# Patient Record
Sex: FEMALE | Race: WHITE | NOT HISPANIC OR LATINO | Employment: FULL TIME | URBAN - METROPOLITAN AREA
[De-identification: names, ages, dates, MRNs, and addresses within clinical notes are randomized per-mention and may not be internally consistent; named-entity substitution may affect disease eponyms.]

---

## 2023-03-01 LAB
THYROTROPIN (MIU/L) IN SER/PLAS BY DETECTION LIMIT <= 0.05 MIU/L: 3.86 MIU/L (ref 0.44–3.98)
THYROXINE (T4) FREE (NG/DL) IN SER/PLAS: 0.98 NG/DL (ref 0.61–1.12)

## 2023-08-31 PROBLEM — M47.816 OSTEOARTHRITIS OF LUMBAR SPINE: Status: ACTIVE | Noted: 2023-08-31

## 2023-08-31 PROBLEM — R92.8 ABNORMAL MAMMOGRAM OF LEFT BREAST: Status: ACTIVE | Noted: 2023-08-31

## 2023-08-31 PROBLEM — E16.2 HYPOGLYCEMIA: Status: ACTIVE | Noted: 2023-08-31

## 2023-08-31 PROBLEM — E55.9 VITAMIN D DEFICIENCY: Status: ACTIVE | Noted: 2023-08-31

## 2023-08-31 PROBLEM — D22.9 ATYPICAL MOLE: Status: ACTIVE | Noted: 2023-08-31

## 2023-08-31 PROBLEM — E03.8 SUBCLINICAL HYPOTHYROIDISM: Status: ACTIVE | Noted: 2023-08-31

## 2023-08-31 PROBLEM — R79.89 ELEVATED TSH: Status: ACTIVE | Noted: 2023-08-31

## 2023-08-31 RX ORDER — PERMETHRIN 50 MG/G
CREAM TOPICAL
COMMUNITY
Start: 2022-09-02 | End: 2024-03-19 | Stop reason: WASHOUT

## 2023-08-31 RX ORDER — IBUPROFEN 200 MG
200 TABLET ORAL EVERY 6 HOURS
COMMUNITY
End: 2024-03-19 | Stop reason: WASHOUT

## 2023-08-31 RX ORDER — LEVOTHYROXINE SODIUM 25 UG/1
1 TABLET ORAL DAILY
COMMUNITY
Start: 2022-05-09 | End: 2023-09-25

## 2023-09-11 LAB — THYROTROPIN (MIU/L) IN SER/PLAS BY DETECTION LIMIT <= 0.05 MIU/L: 2.25 MIU/L (ref 0.44–3.98)

## 2023-09-25 ENCOUNTER — OFFICE VISIT (OUTPATIENT)
Dept: PRIMARY CARE | Facility: CLINIC | Age: 43
End: 2023-09-25
Payer: COMMERCIAL

## 2023-09-25 VITALS
DIASTOLIC BLOOD PRESSURE: 77 MMHG | TEMPERATURE: 97 F | BODY MASS INDEX: 24.96 KG/M2 | SYSTOLIC BLOOD PRESSURE: 122 MMHG | RESPIRATION RATE: 18 BRPM | OXYGEN SATURATION: 98 % | HEIGHT: 67 IN | WEIGHT: 159 LBS | HEART RATE: 55 BPM

## 2023-09-25 DIAGNOSIS — E55.9 VITAMIN D DEFICIENCY: ICD-10-CM

## 2023-09-25 DIAGNOSIS — F90.0 ATTENTION DEFICIT HYPERACTIVITY DISORDER (ADHD), PREDOMINANTLY INATTENTIVE TYPE: ICD-10-CM

## 2023-09-25 DIAGNOSIS — I73.00 RAYNAUD'S DISEASE WITHOUT GANGRENE: ICD-10-CM

## 2023-09-25 DIAGNOSIS — E03.8 SUBCLINICAL HYPOTHYROIDISM: ICD-10-CM

## 2023-09-25 DIAGNOSIS — Z12.31 SCREENING MAMMOGRAM FOR BREAST CANCER: ICD-10-CM

## 2023-09-25 DIAGNOSIS — Z01.00 EYE EXAM NORMAL: Primary | ICD-10-CM

## 2023-09-25 DIAGNOSIS — Z13.220 SCREENING, LIPID: ICD-10-CM

## 2023-09-25 PROBLEM — R10.2 PAIN, PELVIC, FEMALE: Status: ACTIVE | Noted: 2023-09-25

## 2023-09-25 PROBLEM — J45.909 UNCOMPLICATED ASTHMA (HHS-HCC): Status: ACTIVE | Noted: 2023-09-25

## 2023-09-25 PROBLEM — M76.72 PERONEAL TENDINITIS OF LEFT LOWER EXTREMITY: Status: ACTIVE | Noted: 2023-09-25

## 2023-09-25 PROBLEM — N39.41 URGE INCONTINENCE: Status: ACTIVE | Noted: 2023-09-25

## 2023-09-25 PROBLEM — R53.83 FATIGUE: Status: ACTIVE | Noted: 2023-09-25

## 2023-09-25 PROBLEM — U07.1 COVID-19 VIRUS INFECTION: Status: ACTIVE | Noted: 2023-09-25

## 2023-09-25 PROBLEM — R39.15 URINARY URGENCY: Status: ACTIVE | Noted: 2023-09-25

## 2023-09-25 PROBLEM — M79.672 LEFT FOOT PAIN: Status: ACTIVE | Noted: 2023-09-25

## 2023-09-25 PROBLEM — R53.83 TIREDNESS: Status: ACTIVE | Noted: 2023-09-25

## 2023-09-25 PROBLEM — H04.129 DRY EYE: Status: ACTIVE | Noted: 2023-09-25

## 2023-09-25 PROBLEM — M54.50 LOWER BACK PAIN: Status: ACTIVE | Noted: 2023-09-25

## 2023-09-25 LAB
POC AMPHETAMINE: NEGATIVE NG/ML
POC BARBITURATES: NEGATIVE NG/ML
POC BENZODIAZEPINES: NEGATIVE NG/ML
POC BUPRENORPHINE URINE: NEGATIVE NG/ML
POC COCAINE: NEGATIVE NG/ML
POC MDMA (NG/ML) IN URINE: NEGATIVE NG/ML
POC METHADONE MANUALLY ENTERED: NEGATIVE NG/ML
POC METHAMPHETAMINE: NEGATIVE NG/ML
POC MORPHINE URINE: NEGATIVE NG/ML
POC OPIATES: NORMAL NG/ML
POC OXYCODONE: NEGATIVE NG/ML
POC PHENCYCLIDINE (PCP): NEGATIVE NG/ML
POC THC: NEGATIVE NG/ML
POC TICYCLIC ANTIDEPRESSANTS (TCA): NORMAL NG/ML

## 2023-09-25 PROCEDURE — 99396 PREV VISIT EST AGE 40-64: CPT | Performed by: NURSE PRACTITIONER

## 2023-09-25 PROCEDURE — 80305 DRUG TEST PRSMV DIR OPT OBS: CPT | Performed by: NURSE PRACTITIONER

## 2023-09-25 PROCEDURE — 99213 OFFICE O/P EST LOW 20 MIN: CPT | Performed by: NURSE PRACTITIONER

## 2023-09-25 PROCEDURE — 1036F TOBACCO NON-USER: CPT | Performed by: NURSE PRACTITIONER

## 2023-09-25 RX ORDER — DEXTROAMPHETAMINE SACCHARATE, AMPHETAMINE ASPARTATE MONOHYDRATE, DEXTROAMPHETAMINE SULFATE AND AMPHETAMINE SULFATE 3.75; 3.75; 3.75; 3.75 MG/1; MG/1; MG/1; MG/1
15 CAPSULE, EXTENDED RELEASE ORAL DAILY
Qty: 30 CAPSULE | Refills: 0 | Status: SHIPPED | OUTPATIENT
Start: 2023-09-25 | End: 2023-11-06 | Stop reason: SDUPTHER

## 2023-09-25 RX ORDER — DEXTROAMPHETAMINE SACCHARATE, AMPHETAMINE ASPARTATE MONOHYDRATE, DEXTROAMPHETAMINE SULFATE AND AMPHETAMINE SULFATE 3.75; 3.75; 3.75; 3.75 MG/1; MG/1; MG/1; MG/1
15 CAPSULE, EXTENDED RELEASE ORAL EVERY MORNING
Refills: 0 | Status: CANCELLED | OUTPATIENT
Start: 2023-09-25 | End: 2023-10-02

## 2023-09-25 ASSESSMENT — ENCOUNTER SYMPTOMS
FEVER: 0
ABDOMINAL PAIN: 0
PALPITATIONS: 0
NAUSEA: 0
WEAKNESS: 0
COUGH: 0
SPEECH DIFFICULTY: 0
SLEEP DISTURBANCE: 0
WHEEZING: 0
SORE THROAT: 0
NUMBNESS: 1
SHORTNESS OF BREATH: 0
MYALGIAS: 0
ACTIVITY CHANGE: 1
WOUND: 0
CHILLS: 0
VOMITING: 0
ARTHRALGIAS: 0
HEADACHES: 0
CONFUSION: 0
DIZZINESS: 0
NERVOUS/ANXIOUS: 0
APNEA: 0

## 2023-09-25 ASSESSMENT — PATIENT HEALTH QUESTIONNAIRE - PHQ9
1. LITTLE INTEREST OR PLEASURE IN DOING THINGS: NOT AT ALL
SUM OF ALL RESPONSES TO PHQ9 QUESTIONS 1 AND 2: 0
2. FEELING DOWN, DEPRESSED OR HOPELESS: NOT AT ALL

## 2023-09-25 NOTE — PATIENT INSTRUCTIONS
Raynaud’s can be managed with both lifestyle modifications and medications. Patients can use mittens/gloves, thick socks, and insertable warmers to help keep their hands and feet warm. Stress reduction and smoking cessation are also recommended to help decrease Raynaud’s attacks. Medications such as calcium channel blockers (amlodipine, nifedipine, felodipine, and others) and angiotensin-receptor blockers increase blood flow to the fingers and toes. For patients with more severe symptoms or who have developed complications such as ulcers, other medications can be used including sildenafil or prostacyclins.    Living with Raynaud's Phenomenon  Wear warm, protective clothing like socks, boots, mittens or gloves in the fall and winter. In the summer months, avoid keeping the temperature of the AC too low. Wear oven mitts or gloves when taking food out of the freezer or refrigerator. Exercise regularly as this can boost healthy circulation and try to decrease stressful triggers. If symptoms occur, rewarm your body, place hands under the armpits, wiggle fingers and toes, run fingers or toes under warm (not hot) water, or massage the hands and feet.

## 2023-09-25 NOTE — PROGRESS NOTES
Subjective   Patient ID: Solange Akins is a 43 y.o. female who presents for Annual Exam and ADD.    Well Adult Physical   Patient here for a comprehensive physical exam.The patient reports problems - raynauds's, family hx of pagets, ADD     Do you take any herbs or supplements that were not prescribed by a doctor? no   Are you taking calcium supplements? no   Are you taking aspirin daily? no    Patient complains of Raynaud's phenomenon.  Present in bilateral lower extremity and bilateral upper extremities.  Reports exercise typically improves.  Does have a cool, pale and bluish fingertips on today.  Patient is not open to calcium channel blocker at this time.    Family hx of pagels -will recheck   No other risk factors    .Lab Results       Component                Value               Date                       ALKPHOS                  50                  03/31/2022                 Reports that her oldest son was recently diagnosed with ADHD and is currently on Concerta.  Patient reports during the office visit she was told to do an online evaluation for ADD/ADHD and reports of doing online testing which was significant for inattentive disorder.  Patient is seeking treatment today.  I did discuss that we should consult with her seeing a specialist but I am open to starting her on a stimulant after she does a self assessment today in the office.    Hypothyroidism: patient has stopped levothyroixine. Managed by endocrinology   Stable tsh, .Lab Results       Component                Value               Date                       TSH                      2.25                09/11/2023               Needs well eye exam, no issues however          Review of Systems   Constitutional:  Positive for activity change. Negative for chills and fever.   HENT:  Negative for congestion, postnasal drip, sneezing and sore throat.    Respiratory:  Negative for apnea, cough, shortness of breath and wheezing.   "  Cardiovascular:  Negative for chest pain and palpitations.   Gastrointestinal:  Negative for abdominal pain, nausea and vomiting.   Musculoskeletal:  Negative for arthralgias and myalgias.   Skin:  Negative for rash and wound.   Neurological:  Positive for numbness. Negative for dizziness, syncope, speech difficulty, weakness and headaches.   Psychiatric/Behavioral:  Negative for confusion and sleep disturbance. The patient is not nervous/anxious.        Objective   /77   Pulse 55   Temp 36.1 °C (97 °F) (Temporal)   Resp 18   Ht 1.702 m (5' 7\")   Wt 72.1 kg (159 lb)   SpO2 98%   BMI 24.90 kg/m²     Physical Exam  Vitals reviewed.   Constitutional:       Appearance: Normal appearance.   Cardiovascular:      Rate and Rhythm: Normal rate and regular rhythm.      Pulses: Normal pulses.      Heart sounds: Normal heart sounds.   Pulmonary:      Effort: Pulmonary effort is normal.      Breath sounds: Normal breath sounds.   Skin:     Coloration: Skin is cyanotic.          Neurological:      Mental Status: She is alert and oriented to person, place, and time.   Psychiatric:         Mood and Affect: Mood normal.         Behavior: Behavior normal.         Assessment/Plan   Problem List Items Addressed This Visit       Subclinical hypothyroidism     Well controlled   Follow up with endocrinology for management           Relevant Orders    CBC    Comprehensive Metabolic Panel    TSH with reflex to Free T4 if abnormal    Vitamin D deficiency    Relevant Orders    Vitamin D 1,25 Dihydroxy (for eval of hypercalcemia)    Attention deficit hyperactivity disorder (ADHD), predominantly inattentive type     Start [id number] XR daily   Referral to psych for management   Follow up 4-6 weeks            Relevant Medications    amphetamine-dextroamphetamine XR (Adderall XR) 15 mg 24 hr capsule    Other Relevant Orders    Referral to Psychiatry    POCT waived urine drug screen manually resulted (Completed)    Raynaud's " disease without gangrene     Raynaud’s can be managed with both lifestyle modifications and medications. Patients can use mittens/gloves, thick socks, and insertable warmers to help keep their hands and feet warm. Stress reduction and smoking cessation are also recommended to help decrease Raynaud’s attacks. Medications such as calcium channel blockers (amlodipine, nifedipine, felodipine, and others) and angiotensin-receptor blockers increase blood flow to the fingers and toes. For patients with more severe symptoms or who have developed complications such as ulcers, other medications can be used including sildenafil or prostacyclins.    Living with Raynaud's Phenomenon  Wear warm, protective clothing like socks, boots, mittens or gloves in the fall and winter. In the summer months, avoid keeping the temperature of the AC too low. Wear oven mitts or gloves when taking food out of the freezer or refrigerator. Exercise regularly as this can boost healthy circulation and try to decrease stressful triggers. If symptoms occur, rewarm your body, place hands under the armpits, wiggle fingers and toes, run fingers or toes under warm (not hot) water, or massage the hands and feet.         Eye exam normal - Primary    Relevant Orders    Referral to Ophthalmology     Other Visit Diagnoses       Screening mammogram for breast cancer        Relevant Orders    BI mammo bilateral screening tomosynthesis    Screening, lipid        Relevant Orders    CBC    Lipid Panel

## 2023-11-06 ENCOUNTER — TELEPHONE (OUTPATIENT)
Dept: PRIMARY CARE | Facility: CLINIC | Age: 43
End: 2023-11-06
Payer: COMMERCIAL

## 2023-11-06 DIAGNOSIS — F90.0 ATTENTION DEFICIT HYPERACTIVITY DISORDER (ADHD), PREDOMINANTLY INATTENTIVE TYPE: ICD-10-CM

## 2023-11-06 RX ORDER — DEXTROAMPHETAMINE SACCHARATE, AMPHETAMINE ASPARTATE MONOHYDRATE, DEXTROAMPHETAMINE SULFATE AND AMPHETAMINE SULFATE 3.75; 3.75; 3.75; 3.75 MG/1; MG/1; MG/1; MG/1
15 CAPSULE, EXTENDED RELEASE ORAL DAILY
Qty: 30 CAPSULE | Refills: 0 | Status: SHIPPED | OUTPATIENT
Start: 2023-11-06

## 2023-11-06 NOTE — TELEPHONE ENCOUNTER
Rx Refill Request Telephone Encounter    Name:  Solange Akins  :  [id number]  Medication Name:        amphetamine-dextroamphetamine XR (Adderall XR) 15 mg 24 hr capsule [[id number]]  1 cap taken once daily                    Specific Pharmacy location:  [facility]  Date of last appointment:  2023  Date of next appointment:  2024  Best number to reach patient:  [phone]

## 2023-11-13 ENCOUNTER — ANCILLARY PROCEDURE (OUTPATIENT)
Dept: RADIOLOGY | Facility: CLINIC | Age: 43
End: 2023-11-13
Payer: COMMERCIAL

## 2023-11-13 DIAGNOSIS — Z12.31 SCREENING MAMMOGRAM FOR BREAST CANCER: ICD-10-CM

## 2023-11-13 PROCEDURE — 77063 BREAST TOMOSYNTHESIS BI: CPT | Mod: BILATERAL PROCEDURE | Performed by: RADIOLOGY

## 2023-11-13 PROCEDURE — 77067 SCR MAMMO BI INCL CAD: CPT

## 2023-11-13 PROCEDURE — 77063 BREAST TOMOSYNTHESIS BI: CPT

## 2023-11-13 PROCEDURE — 77067 SCR MAMMO BI INCL CAD: CPT | Mod: BILATERAL PROCEDURE | Performed by: RADIOLOGY

## 2023-12-21 ENCOUNTER — OFFICE VISIT (OUTPATIENT)
Dept: OPHTHALMOLOGY | Facility: CLINIC | Age: 43
End: 2023-12-21
Payer: COMMERCIAL

## 2023-12-21 DIAGNOSIS — H52.03 HYPEROPIA WITH PRESBYOPIA OF BOTH EYES: ICD-10-CM

## 2023-12-21 DIAGNOSIS — Z01.00 EYE EXAM NORMAL: ICD-10-CM

## 2023-12-21 DIAGNOSIS — H52.4 HYPEROPIA WITH PRESBYOPIA OF BOTH EYES: ICD-10-CM

## 2023-12-21 DIAGNOSIS — H18.831 RCE (RECURRENT CORNEAL EROSION), RIGHT: Primary | ICD-10-CM

## 2023-12-21 PROCEDURE — 92004 COMPRE OPH EXAM NEW PT 1/>: CPT | Performed by: OPTOMETRIST

## 2023-12-21 ASSESSMENT — EXTERNAL EXAM - LEFT EYE: OS_EXAM: NORMAL

## 2023-12-21 ASSESSMENT — REFRACTION
OD_SPHERE: +1.50
OD_CYLINDER: -0.50
OD_ADD: +1.00
OS_SPHERE: +1.00
OD_AXIS: 090
OS_ADD: +1.00
OS_CYLINDER: SPHERE

## 2023-12-21 ASSESSMENT — REFRACTION_MANIFEST
OS_CYLINDER: SPHERE
OS_CYLINDER: SPHERE
OD_CYLINDER: -0.50
OD_CYLINDER: -0.50
METHOD_AUTOREFRACTION: 1
OD_SPHERE: +1.50
OS_SPHERE: +1.50
OD_AXIS: 090
OD_AXIS: 085
OS_SPHERE: +0.25
OD_SPHERE: +0.75

## 2023-12-21 ASSESSMENT — REFRACTION_WEARINGRX
OS_SPHERE: +1.25
OS_CYLINDER: SPHERE
OD_SPHERE: +1.00
OD_CYLINDER: SPHERE

## 2023-12-21 ASSESSMENT — CONF VISUAL FIELD
OD_SUPERIOR_NASAL_RESTRICTION: 0
OD_NORMAL: 1
OS_NORMAL: 1
OS_INFERIOR_TEMPORAL_RESTRICTION: 0
OD_INFERIOR_NASAL_RESTRICTION: 0
OD_SUPERIOR_TEMPORAL_RESTRICTION: 0
OS_SUPERIOR_NASAL_RESTRICTION: 0
OD_INFERIOR_TEMPORAL_RESTRICTION: 0
OS_INFERIOR_NASAL_RESTRICTION: 0
OS_SUPERIOR_TEMPORAL_RESTRICTION: 0

## 2023-12-21 ASSESSMENT — ENCOUNTER SYMPTOMS
PSYCHIATRIC NEGATIVE: 0
HEMATOLOGIC/LYMPHATIC NEGATIVE: 0
MUSCULOSKELETAL NEGATIVE: 0
ENDOCRINE NEGATIVE: 0
GASTROINTESTINAL NEGATIVE: 0
CONSTITUTIONAL NEGATIVE: 0
NEUROLOGICAL NEGATIVE: 0
CARDIOVASCULAR NEGATIVE: 0
ALLERGIC/IMMUNOLOGIC NEGATIVE: 0
RESPIRATORY NEGATIVE: 0
EYES NEGATIVE: 1

## 2023-12-21 ASSESSMENT — EXTERNAL EXAM - RIGHT EYE: OD_EXAM: NORMAL

## 2023-12-21 ASSESSMENT — VISUAL ACUITY
OD_CC: 20/20
METHOD: SNELLEN - LINEAR
OS_CC: 20/20
CORRECTION_TYPE: GLASSES

## 2023-12-21 ASSESSMENT — TONOMETRY
OS_IOP_MMHG: 16
IOP_METHOD: TONOPEN
OD_IOP_MMHG: 12

## 2023-12-21 ASSESSMENT — SLIT LAMP EXAM - LIDS: COMMENTS: NORMAL

## 2023-12-21 NOTE — PROGRESS NOTES
RCE and can use iVizia QAM and instill in closed eye between eyelids for reduced risk of recurrent RCE.    Mild latent hyperopia and now onset of presbyopia. A spectacle prescription was dispensed to be used as needed.     The patient was asked to return to our clinic in one year or sooner if ocular or vision changes occur.

## 2024-03-18 ENCOUNTER — LAB (OUTPATIENT)
Dept: LAB | Facility: LAB | Age: 44
End: 2024-03-18
Payer: COMMERCIAL

## 2024-03-18 DIAGNOSIS — E55.9 VITAMIN D DEFICIENCY: ICD-10-CM

## 2024-03-18 DIAGNOSIS — E03.8 SUBCLINICAL HYPOTHYROIDISM: ICD-10-CM

## 2024-03-18 DIAGNOSIS — Z13.220 SCREENING, LIPID: ICD-10-CM

## 2024-03-18 LAB
ALBUMIN SERPL BCP-MCNC: 4.3 G/DL (ref 3.4–5)
ALP SERPL-CCNC: 51 U/L (ref 33–110)
ALT SERPL W P-5'-P-CCNC: 12 U/L (ref 7–45)
ANION GAP SERPL CALC-SCNC: 10 MMOL/L (ref 10–20)
AST SERPL W P-5'-P-CCNC: 18 U/L (ref 9–39)
BILIRUB SERPL-MCNC: 0.3 MG/DL (ref 0–1.2)
BUN SERPL-MCNC: 11 MG/DL (ref 6–23)
CALCIUM SERPL-MCNC: 8.7 MG/DL (ref 8.6–10.3)
CHLORIDE SERPL-SCNC: 108 MMOL/L (ref 98–107)
CHOLEST SERPL-MCNC: 149 MG/DL (ref 0–199)
CHOLESTEROL/HDL RATIO: 1.9
CO2 SERPL-SCNC: 27 MMOL/L (ref 21–32)
CREAT SERPL-MCNC: 0.79 MG/DL (ref 0.5–1.05)
EGFRCR SERPLBLD CKD-EPI 2021: >90 ML/MIN/1.73M*2
ERYTHROCYTE [DISTWIDTH] IN BLOOD BY AUTOMATED COUNT: 13.5 % (ref 11.5–14.5)
GLUCOSE SERPL-MCNC: 88 MG/DL (ref 74–99)
HCT VFR BLD AUTO: 39.8 % (ref 36–46)
HDLC SERPL-MCNC: 77 MG/DL
HGB BLD-MCNC: 13 G/DL (ref 12–16)
LDLC SERPL CALC-MCNC: 58 MG/DL
MCH RBC QN AUTO: 30 PG (ref 26–34)
MCHC RBC AUTO-ENTMCNC: 32.7 G/DL (ref 32–36)
MCV RBC AUTO: 92 FL (ref 80–100)
NON HDL CHOLESTEROL: 72 MG/DL (ref 0–149)
NRBC BLD-RTO: 0 /100 WBCS (ref 0–0)
PLATELET # BLD AUTO: 229 X10*3/UL (ref 150–450)
POTASSIUM SERPL-SCNC: 4.1 MMOL/L (ref 3.5–5.3)
PROT SERPL-MCNC: 6.4 G/DL (ref 6.4–8.2)
RBC # BLD AUTO: 4.33 X10*6/UL (ref 4–5.2)
SODIUM SERPL-SCNC: 141 MMOL/L (ref 136–145)
TRIGL SERPL-MCNC: 68 MG/DL (ref 0–149)
TSH SERPL-ACNC: 2.31 MIU/L (ref 0.44–3.98)
VLDL: 14 MG/DL (ref 0–40)
WBC # BLD AUTO: 9.7 X10*3/UL (ref 4.4–11.3)

## 2024-03-18 PROCEDURE — 82652 VIT D 1 25-DIHYDROXY: CPT

## 2024-03-18 PROCEDURE — 84443 ASSAY THYROID STIM HORMONE: CPT

## 2024-03-18 PROCEDURE — 80061 LIPID PANEL: CPT

## 2024-03-18 PROCEDURE — 80053 COMPREHEN METABOLIC PANEL: CPT

## 2024-03-18 PROCEDURE — 36415 COLL VENOUS BLD VENIPUNCTURE: CPT

## 2024-03-18 PROCEDURE — 85027 COMPLETE CBC AUTOMATED: CPT

## 2024-03-19 ENCOUNTER — OFFICE VISIT (OUTPATIENT)
Dept: PRIMARY CARE | Facility: CLINIC | Age: 44
End: 2024-03-19
Payer: COMMERCIAL

## 2024-03-19 VITALS
HEART RATE: 65 BPM | TEMPERATURE: 97 F | RESPIRATION RATE: 16 BRPM | DIASTOLIC BLOOD PRESSURE: 78 MMHG | OXYGEN SATURATION: 98 % | HEIGHT: 67 IN | SYSTOLIC BLOOD PRESSURE: 125 MMHG | BODY MASS INDEX: 24.64 KG/M2 | WEIGHT: 157 LBS

## 2024-03-19 DIAGNOSIS — Z13.220 SCREENING, LIPID: ICD-10-CM

## 2024-03-19 DIAGNOSIS — E53.8 VITAMIN B12 DEFICIENCY: ICD-10-CM

## 2024-03-19 DIAGNOSIS — Z13.0 SCREENING FOR DEFICIENCY ANEMIA: ICD-10-CM

## 2024-03-19 DIAGNOSIS — E53.1 VITAMIN B6 DEFICIENCY: ICD-10-CM

## 2024-03-19 DIAGNOSIS — E55.9 VITAMIN D DEFICIENCY: Primary | ICD-10-CM

## 2024-03-19 DIAGNOSIS — R20.2 PARESTHESIA OF UPPER AND LOWER EXTREMITIES OF BOTH SIDES: ICD-10-CM

## 2024-03-19 DIAGNOSIS — R79.89 ELEVATED TSH: ICD-10-CM

## 2024-03-19 DIAGNOSIS — F90.0 ATTENTION DEFICIT HYPERACTIVITY DISORDER (ADHD), PREDOMINANTLY INATTENTIVE TYPE: ICD-10-CM

## 2024-03-19 PROCEDURE — 99213 OFFICE O/P EST LOW 20 MIN: CPT | Performed by: NURSE PRACTITIONER

## 2024-03-19 PROCEDURE — 1036F TOBACCO NON-USER: CPT | Performed by: NURSE PRACTITIONER

## 2024-03-19 RX ORDER — DEXTROAMPHETAMINE SACCHARATE, AMPHETAMINE ASPARTATE MONOHYDRATE, DEXTROAMPHETAMINE SULFATE AND AMPHETAMINE SULFATE 5; 5; 5; 5 MG/1; MG/1; MG/1; MG/1
25 CAPSULE, EXTENDED RELEASE ORAL
COMMUNITY
Start: 2023-12-15

## 2024-03-19 RX ORDER — ACETAMINOPHEN 500 MG
2000 TABLET ORAL
COMMUNITY
Start: 2019-10-09

## 2024-03-19 ASSESSMENT — ENCOUNTER SYMPTOMS
CONSTITUTIONAL NEGATIVE: 1
SHORTNESS OF BREATH: 0
PALPITATIONS: 0
CHILLS: 0
DIZZINESS: 0
ARTHRALGIAS: 0
SPEECH DIFFICULTY: 0
MYALGIAS: 0
APNEA: 0
NAUSEA: 0
SLEEP DISTURBANCE: 0
NERVOUS/ANXIOUS: 0
SORE THROAT: 0
HEADACHES: 0
CONFUSION: 0
FEVER: 0
WEAKNESS: 0
ABDOMINAL PAIN: 0
VOMITING: 0
ACTIVITY CHANGE: 0
NUMBNESS: 1
COUGH: 0

## 2024-03-19 NOTE — ASSESSMENT & PLAN NOTE
Emg study to rule out radiculopathy fiber neuropathy   Follow up prn   Can consider treating raynauds phenomenon

## 2024-03-19 NOTE — PROGRESS NOTES
Subjective   Patient ID: Solange Akins is a 43 y.o. female who presents for ADHD, Lab Review, and Numbness and Tingling of Pancho Upper and Lower Extremities.    Follow up lab review   Psych now managing adderall. Dose increased recently to 25mg. Doing well and benefiting     Raynauds: would like to consider meds eventually. Numbness / tingling and discolored hands and feet. Discussed conservative treatment plans at previous visit   Labs Reviewed   -CBC:   Collection Time: 03/18/24  8:10 AM       Result                      Value             Ref Range           WBC                         9.7               4.4 - 11.3 x*       nRBC                        0.0               0.0 - 0.0 /1*       RBC                         4.33              4.00 - 5.20 *       Hemoglobin                  13.0              12.0 - 16.0 *       Hematocrit                  39.8              36.0 - 46.0 %       MCV                         92                80 - 100 fL         MCH                         30.0              26.0 - 34.0 *       MCHC                        32.7              32.0 - 36.0 *       RDW                         13.5              11.5 - 14.5 %       Platelets                   229               150 - 450 x1*  -Comprehensive Metabolic Panel:   Collection Time: 03/18/24  8:10 AM       Result                      Value             Ref Range           Glucose                     88                74 - 99 mg/dL       Sodium                      141               136 - 145 mm*       Potassium                   4.1               3.5 - 5.3 mm*       Chloride                    108 (H)           98 - 107 mmo*       Bicarbonate                 27                21 - 32 mmol*       Anion Gap                   10                10 - 20 mmol*       Urea Nitrogen               11                6 - 23 mg/dL        Creatinine                  0.79              0.50 - 1.05 *       eGFR                        >90               >60  mL/min/1*       Calcium                     8.7               8.6 - 10.3 m*       Albumin                     4.3               3.4 - 5.0 g/*       Alkaline Phosphatase        51                33 - 110 U/L        Total Protein               6.4               6.4 - 8.2 g/*       AST                         18                9 - 39 U/L          Bilirubin, Total            0.3               0.0 - 1.2 mg*       ALT                         12                7 - 45 U/L     -Lipid Panel:   Collection Time: 03/18/24  8:10 AM       Result                      Value             Ref Range           Cholesterol                 149               0 - 199 mg/dL       HDL-Cholesterol             77.0              mg/dL               Cholesterol/HDL Ratio       1.9                                   LDL Calculated              58                <=99 mg/dL          VLDL                        14                0 - 40 mg/dL        Triglycerides               68                0 - 149 mg/dL       Non HDL Cholesterol         72                0 - 149 mg/dL  -TSH with reflex to Free T4 if abnormal:   Collection Time: 03/18/24  8:10 AM       Result                      Value             Ref Range           Thyroid Stimulating Ho*     2.31              0.44 - 3.98 *          Review of Systems   Constitutional: Negative.  Negative for activity change, chills and fever.   HENT:  Negative for congestion, postnasal drip, sneezing and sore throat.    Respiratory:  Negative for apnea, cough and shortness of breath.    Cardiovascular:  Negative for chest pain and palpitations.   Gastrointestinal:  Negative for abdominal pain, nausea and vomiting.   Musculoskeletal:  Negative for arthralgias and myalgias.   Neurological:  Positive for numbness. Negative for dizziness, syncope, speech difficulty, weakness and headaches.   Psychiatric/Behavioral:  Negative for confusion and sleep disturbance. The patient is not nervous/anxious.        Objective   BP  "125/78 (BP Location: Right arm, Patient Position: Sitting, BP Cuff Size: Adult long)   Pulse 65   Temp 36.1 °C (97 °F) (Temporal)   Resp 16   Ht 1.702 m (5' 7\")   Wt 71.2 kg (157 lb)   SpO2 98%   BMI 24.59 kg/m²     Physical Exam  Vitals reviewed.   Constitutional:       Appearance: Normal appearance.   Cardiovascular:      Rate and Rhythm: Normal rate and regular rhythm.      Pulses: Normal pulses.      Heart sounds: Normal heart sounds.   Pulmonary:      Effort: Pulmonary effort is normal.      Breath sounds: Normal breath sounds.   Abdominal:      General: Bowel sounds are normal.   Neurological:      Mental Status: She is alert and oriented to person, place, and time.   Psychiatric:         Mood and Affect: Mood normal.         Behavior: Behavior normal.         Assessment/Plan   Problem List Items Addressed This Visit             ICD-10-CM    Elevated TSH R79.89     Stable, continue to monitor            Relevant Orders    TSH with reflex to Free T4 if abnormal    Vitamin D deficiency - Primary E55.9     Continue supplement   Vitamin d normal            Relevant Orders    Vitamin D 1,25 Dihydroxy (for eval of hypercalcemia)    Attention deficit hyperactivity disorder (ADHD), predominantly inattentive type F90.0     Managed by psychiatry   On adderall 25mg daily   Call for worsening            Paresthesia of upper and lower extremities of both sides R20.2     Emg study to rule out radiculopathy fiber neuropathy   Follow up prn   Can consider treating raynauds phenomenon          Relevant Orders    EMG & nerve conduction    Comprehensive Metabolic Panel     Other Visit Diagnoses         Codes    Screening, lipid     Z13.220    Relevant Orders    Lipid Panel    Screening for deficiency anemia     Z13.0    Relevant Orders    CBC and Auto Differential    Vitamin B12 deficiency     E53.8    Relevant Orders    Vitamin B12    Vitamin B6 deficiency     E53.1    Relevant Orders    Vitamin B6                  "

## 2024-03-20 LAB — 1,25(OH)2D3 SERPL-MCNC: 38.5 PG/ML (ref 19.9–79.3)

## 2024-03-25 ENCOUNTER — APPOINTMENT (OUTPATIENT)
Dept: PRIMARY CARE | Facility: CLINIC | Age: 44
End: 2024-03-25
Payer: COMMERCIAL

## 2024-11-15 ENCOUNTER — OFFICE VISIT (OUTPATIENT)
Dept: URGENT CARE | Age: 44
End: 2024-11-15
Payer: COMMERCIAL

## 2024-11-15 ENCOUNTER — ANCILLARY PROCEDURE (OUTPATIENT)
Dept: URGENT CARE | Age: 44
End: 2024-11-15
Payer: COMMERCIAL

## 2024-11-15 VITALS — OXYGEN SATURATION: 98 % | DIASTOLIC BLOOD PRESSURE: 85 MMHG | HEART RATE: 74 BPM | SYSTOLIC BLOOD PRESSURE: 118 MMHG

## 2024-11-15 DIAGNOSIS — R07.81 RIB PAIN ON LEFT SIDE: ICD-10-CM

## 2024-11-15 DIAGNOSIS — R07.81 RIB PAIN ON LEFT SIDE: Primary | ICD-10-CM

## 2024-11-15 PROCEDURE — 71101 X-RAY EXAM UNILAT RIBS/CHEST: CPT | Mod: LEFT SIDE

## 2024-11-15 ASSESSMENT — ENCOUNTER SYMPTOMS
COLOR CHANGE: 0
WOUND: 0
PALPITATIONS: 0
WHEEZING: 0
SHORTNESS OF BREATH: 0
BACK PAIN: 1
FEVER: 0
DIZZINESS: 0
CHILLS: 0
COUGH: 0
DIARRHEA: 0
NAUSEA: 0
VOMITING: 0
MYALGIAS: 1

## 2024-11-15 NOTE — PROGRESS NOTES
Subjective   Patient ID: Solange Rowland is a 44 y.o. female. They present today with a chief complaint of Other (Left lower intercostal pain x 2 days /Worsening with movement, referred pain into left shoulder , deep breaths worsening, tender to touch, denies abdominal pain. ).    History of Present Illness  Patient presents with left rib pain for 2 days. Patient denies abd pain. Explains that pain is worse to touch, worse with deep breathing, twisting of torso and elevation of left arm. Denies skin changes including rashes, swelling, bruising. No known injury. Denies uri symptoms including cough, congestion. Denies fever, chills, sweats. Denies n/v/d. Denies chest pain, sob.           Past Medical History  Allergies as of 11/15/2024 - Reviewed 11/15/2024   Allergen Reaction Noted    Latex Itching and Rash 08/31/2023       (Not in a hospital admission)       Past Medical History:   Diagnosis Date    Nondisplaced fracture of fifth metatarsal bone, right foot, initial encounter for closed fracture 09/26/2018    Closed nondisplaced fracture of fifth metatarsal bone of right foot, initial encounter    Personal history of diseases of the skin and subcutaneous tissue 08/18/2020    History of rosacea    Personal history of other specified conditions 06/26/2019    History of palpitations    Unspecified asthma, uncomplicated ([facility])     Childhood asthma    Unspecified injury of right foot, initial encounter 09/25/2018    Injury of right foot, initial encounter       Past Surgical History:   Procedure Laterality Date    OTHER SURGICAL HISTORY  08/19/2021    No history of surgery        reports that she has never smoked. She has never used smokeless tobacco. She reports current alcohol use of about 1.0 standard drink of alcohol per week. She reports that she does not use drugs.    Review of Systems  Review of Systems   Constitutional:  Negative for chills and fever.   HENT:  Negative for congestion.    Respiratory:   Negative for cough, shortness of breath and wheezing.    Cardiovascular:  Positive for chest pain. Negative for palpitations.   Gastrointestinal:  Negative for diarrhea, nausea and vomiting.   Musculoskeletal:  Positive for back pain and myalgias.   Skin:  Negative for color change, rash and wound.   Neurological:  Negative for dizziness.                                  Objective    Vitals:    11/15/24 1851   BP: 118/85   Pulse: 74   SpO2: 98%     No LMP recorded.    Physical Exam  Constitutional:       General: She is not in acute distress.     Appearance: Normal appearance. She is not ill-appearing.   Cardiovascular:      Rate and Rhythm: Normal rate and regular rhythm.      Pulses: Normal pulses.   Pulmonary:      Effort: Pulmonary effort is normal. No accessory muscle usage, respiratory distress or retractions.      Breath sounds: Normal breath sounds. No decreased air movement. No decreased breath sounds, wheezing or rales.   Chest:      Chest wall: Tenderness present. No deformity, swelling or crepitus.      Comments: Point tenderness to palpation over anterior left lower ribs, chest pain is reproducible. No flail chest or deformity. No palpation over sternum or xiphoid process.   Abdominal:      General: Abdomen is flat. There is no distension.      Palpations: Abdomen is soft.      Tenderness: There is no abdominal tenderness. There is no guarding or rebound.   Skin:     General: Skin is warm.      Findings: No abrasion, abscess, ecchymosis, erythema, rash or wound.   Neurological:      Mental Status: She is alert.         Procedures    Point of Care Test & Imaging Results from this visit  No results found for this visit on 11/15/24.   No results found.    Diagnostic study results (if any) were reviewed by Pennie Alston PA-C.    Assessment/Plan   Allergies, medications, history, and pertinent labs/EKGs/Imaging reviewed by Pennie Alston PA-C.     Medical Decision Making  MDM- History and  examination consistent with contusion vs inflammatory process, ddx costochondritis of ribs. No evidence of compartment syndrome, sprain, or cellulitis. Imaging is negative for fractures or other acute bony abnormalities on wet read, will confirm with radiology read. No suspicion for pulmonary or cardiac involvement at this time. Vitals wnl. Plan is for RICE and supportive treatment at home and inflammatory medication, recommended ibuprofen at home every 4-6 hours. If no improvement in 48 hours or symptoms worsen, advised to go to ER. Patient verbalized understanding and agrees with plan.     Attempted to call patient with xray results, no response left message 11/15/2024, 7:45 PM  Orders and Diagnoses  Diagnoses and all orders for this visit:  Rib pain on left side  -     XR ribs 2 views left w chest pa or ap; Future      Medical Admin Record      Patient disposition: Home    Electronically signed by Pennie Alston PA-C  7:53 PM

## 2024-11-15 NOTE — PATIENT INSTRUCTIONS
XRAY AT [facility] LOCATION    [address] [address] [phone]    I will call with results and additional plan.

## 2025-01-03 ENCOUNTER — APPOINTMENT (OUTPATIENT)
Dept: OPHTHALMOLOGY | Facility: CLINIC | Age: 45
End: 2025-01-03
Payer: COMMERCIAL

## 2025-01-16 ENCOUNTER — APPOINTMENT (OUTPATIENT)
Dept: OPHTHALMOLOGY | Facility: CLINIC | Age: 45
End: 2025-01-16
Payer: COMMERCIAL

## 2025-01-16 DIAGNOSIS — H01.02B SQUAMOUS BLEPHARITIS OF UPPER AND LOWER EYELIDS OF BOTH EYES: ICD-10-CM

## 2025-01-16 DIAGNOSIS — H04.129 DRY EYE: ICD-10-CM

## 2025-01-16 DIAGNOSIS — H01.02A SQUAMOUS BLEPHARITIS OF UPPER AND LOWER EYELIDS OF BOTH EYES: ICD-10-CM

## 2025-01-16 DIAGNOSIS — B88.0 INFESTATION BY DEMODEX: ICD-10-CM

## 2025-01-16 DIAGNOSIS — H18.831 RCE (RECURRENT CORNEAL EROSION), RIGHT: Primary | ICD-10-CM

## 2025-01-16 DIAGNOSIS — H52.03 HYPEROPIA WITH PRESBYOPIA OF BOTH EYES: ICD-10-CM

## 2025-01-16 DIAGNOSIS — H52.4 HYPEROPIA WITH PRESBYOPIA OF BOTH EYES: ICD-10-CM

## 2025-01-16 PROCEDURE — 92014 COMPRE OPH EXAM EST PT 1/>: CPT | Performed by: OPTOMETRIST

## 2025-01-16 PROCEDURE — 92015 DETERMINE REFRACTIVE STATE: CPT | Performed by: OPTOMETRIST

## 2025-01-16 RX ORDER — LOTILANER OPHTHALMIC SOLUTION 2.5 MG/ML
1 SOLUTION/ DROPS OPHTHALMIC EVERY 12 HOURS
Qty: 10 ML | Refills: 0 | Status: SHIPPED | OUTPATIENT
Start: 2025-01-16 | End: 2025-02-27

## 2025-01-16 ASSESSMENT — CUP TO DISC RATIO
OS_RATIO: 0.0
OD_RATIO: 0.0

## 2025-01-16 ASSESSMENT — CONF VISUAL FIELD
OS_INFERIOR_NASAL_RESTRICTION: 0
OD_INFERIOR_NASAL_RESTRICTION: 0
OS_SUPERIOR_NASAL_RESTRICTION: 0
OS_NORMAL: 1
OD_SUPERIOR_NASAL_RESTRICTION: 0
OD_INFERIOR_TEMPORAL_RESTRICTION: 0
OS_INFERIOR_TEMPORAL_RESTRICTION: 0
OD_NORMAL: 1
OD_SUPERIOR_TEMPORAL_RESTRICTION: 0
OS_SUPERIOR_TEMPORAL_RESTRICTION: 0

## 2025-01-16 ASSESSMENT — REFRACTION_MANIFEST
OD_ADD: +1.00
OS_ADD: +1.00
OD_CYLINDER: SPHERE
OS_SPHERE: +1.50
OS_CYLINDER: SPHERE
OD_SPHERE: +1.00

## 2025-01-16 ASSESSMENT — VISUAL ACUITY
CORRECTION_TYPE: GLASSES
OS_CC: 20/20
OD_CC: 20/20
METHOD: SNELLEN - LINEAR

## 2025-01-16 ASSESSMENT — SLIT LAMP EXAM - LIDS: COMMENTS: NORMAL

## 2025-01-16 ASSESSMENT — TONOMETRY
IOP_METHOD: GOLDMANN APPLANATION
OD_IOP_MMHG: 15
OS_IOP_MMHG: 16

## 2025-01-16 ASSESSMENT — EXTERNAL EXAM - LEFT EYE: OS_EXAM: NORMAL

## 2025-01-16 ASSESSMENT — EXTERNAL EXAM - RIGHT EYE: OD_EXAM: NORMAL

## 2025-01-16 ASSESSMENT — REFRACTION_WEARINGRX
SPECS_TYPE: SVL
OS_SPHERE: +1.25
OD_CYLINDER: SPHERE
OS_CYLINDER: SPHERE
OD_SPHERE: +1.00

## 2025-01-16 NOTE — PROGRESS NOTES
RCE and can use iVizia QAM and instill in closed eye between eyelids for reduced risk of recurrent RCE. No episodes of RCE since last year.     Blepharitis: Meibomain gland disease (Effron scale 0-4, 0:no abnormality, 4: thick creamy yellow expression at all gland orifices, expression continuous, conjunctival redness). Collarettes (0-4, 0: 0-2 lashes with collarettes, 1: 3-10, 2: >10 but <1/3rd of lashes, 3: >1/3rd to <2/3rd of lashes, 4: >2/3rd of lashes.  Right eye (OD): stage 2 collarettes: stage 2  Left eye (OS): stage 2 collarettes: stage 2    Start course of XDEMVY sent to pharmacy.      Mild latent hyperopia and now onset of presbyopia. A spectacle prescription was dispensed to be used as needed. Recommend full correction distance and start with 1st bifocal add.    The patient was asked to return to our clinic in one year or sooner if ocular or vision changes occur.

## 2025-03-10 ENCOUNTER — APPOINTMENT (OUTPATIENT)
Dept: PRIMARY CARE | Facility: CLINIC | Age: 45
End: 2025-03-10
Payer: COMMERCIAL

## 2025-04-09 ENCOUNTER — PATIENT MESSAGE (OUTPATIENT)
Dept: PRIMARY CARE | Facility: CLINIC | Age: 45
End: 2025-04-09
Payer: COMMERCIAL

## 2025-04-09 DIAGNOSIS — Z13.6 ENCOUNTER FOR LIPID SCREENING FOR CARDIOVASCULAR DISEASE: ICD-10-CM

## 2025-04-09 DIAGNOSIS — E55.9 VITAMIN D DEFICIENCY: ICD-10-CM

## 2025-04-09 DIAGNOSIS — Z13.220 ENCOUNTER FOR LIPID SCREENING FOR CARDIOVASCULAR DISEASE: ICD-10-CM

## 2025-04-09 DIAGNOSIS — Z12.31 SCREENING MAMMOGRAM FOR BREAST CANCER: Primary | ICD-10-CM

## 2025-04-09 DIAGNOSIS — E03.8 SUBCLINICAL HYPOTHYROIDISM: ICD-10-CM

## 2025-04-16 ENCOUNTER — TELEPHONE (OUTPATIENT)
Dept: PRIMARY CARE | Facility: CLINIC | Age: 45
End: 2025-04-16
Payer: COMMERCIAL

## 2025-04-16 NOTE — TELEPHONE ENCOUNTER
The patient called to schedule a follow up appointment from two ED visits she had had recently.     She was in on 4/10 and 4/13.     Those records are in Care Everywhere.     The patient stated they suggested internal shingles and/or gallbladder issues.  She was told to follow up with pcp for additional testing.     The patient is scheduled 4/21/2025.   She asked if there were any tests the IB would want her to do prior to her visit.     Patient was advised IB was out of the office today and that she would probably want to see the patient first.

## 2025-04-18 LAB
25(OH)D3+25(OH)D2 SERPL-MCNC: 29 NG/ML (ref 30–100)
ALBUMIN SERPL-MCNC: 4.4 G/DL (ref 3.6–5.1)
ALP SERPL-CCNC: 49 U/L (ref 31–125)
ALT SERPL-CCNC: 17 U/L (ref 6–29)
ANION GAP SERPL CALCULATED.4IONS-SCNC: 8 MMOL/L (CALC) (ref 7–17)
AST SERPL-CCNC: 18 U/L (ref 10–35)
BASOPHILS # BLD AUTO: 73 CELLS/UL (ref 0–200)
BASOPHILS NFR BLD AUTO: 0.9 %
BILIRUB SERPL-MCNC: 0.2 MG/DL (ref 0.2–1.2)
BUN SERPL-MCNC: 16 MG/DL (ref 7–25)
CALCIUM SERPL-MCNC: 9.5 MG/DL (ref 8.6–10.2)
CHLORIDE SERPL-SCNC: 108 MMOL/L (ref 98–110)
CHOLEST SERPL-MCNC: 167 MG/DL
CHOLEST/HDLC SERPL: 2.5 (CALC)
CO2 SERPL-SCNC: 26 MMOL/L (ref 20–32)
CREAT SERPL-MCNC: 0.68 MG/DL (ref 0.5–0.99)
EGFRCR SERPLBLD CKD-EPI 2021: 109 ML/MIN/1.73M2
EOSINOPHIL # BLD AUTO: 284 CELLS/UL (ref 15–500)
EOSINOPHIL NFR BLD AUTO: 3.5 %
ERYTHROCYTE [DISTWIDTH] IN BLOOD BY AUTOMATED COUNT: 13.3 % (ref 11–15)
GLUCOSE SERPL-MCNC: 98 MG/DL (ref 65–99)
HCT VFR BLD AUTO: 40.7 % (ref 35–45)
HDLC SERPL-MCNC: 67 MG/DL
HGB BLD-MCNC: 13.2 G/DL (ref 11.7–15.5)
LDLC SERPL CALC-MCNC: 78 MG/DL (CALC)
LYMPHOCYTES # BLD AUTO: 3524 CELLS/UL (ref 850–3900)
LYMPHOCYTES NFR BLD AUTO: 43.5 %
MCH RBC QN AUTO: 30.3 PG (ref 27–33)
MCHC RBC AUTO-ENTMCNC: 32.4 G/DL (ref 32–36)
MCV RBC AUTO: 93.6 FL (ref 80–100)
MONOCYTES # BLD AUTO: 543 CELLS/UL (ref 200–950)
MONOCYTES NFR BLD AUTO: 6.7 %
NEUTROPHILS # BLD AUTO: 3677 CELLS/UL (ref 1500–7800)
NEUTROPHILS NFR BLD AUTO: 45.4 %
NONHDLC SERPL-MCNC: 100 MG/DL (CALC)
PLATELET # BLD AUTO: 273 THOUSAND/UL (ref 140–400)
PMV BLD REES-ECKER: 10.2 FL (ref 7.5–12.5)
POTASSIUM SERPL-SCNC: 4.8 MMOL/L (ref 3.5–5.3)
PROT SERPL-MCNC: 6.8 G/DL (ref 6.1–8.1)
RBC # BLD AUTO: 4.35 MILLION/UL (ref 3.8–5.1)
SODIUM SERPL-SCNC: 142 MMOL/L (ref 135–146)
TRIGL SERPL-MCNC: 119 MG/DL
TSH SERPL-ACNC: 3.71 MIU/L
WBC # BLD AUTO: 8.1 THOUSAND/UL (ref 3.8–10.8)

## 2025-04-21 ENCOUNTER — APPOINTMENT (OUTPATIENT)
Dept: PRIMARY CARE | Facility: CLINIC | Age: 45
End: 2025-04-21
Payer: COMMERCIAL

## 2025-04-21 VITALS
RESPIRATION RATE: 18 BRPM | TEMPERATURE: 98 F | HEART RATE: 63 BPM | DIASTOLIC BLOOD PRESSURE: 76 MMHG | OXYGEN SATURATION: 97 % | SYSTOLIC BLOOD PRESSURE: 116 MMHG | BODY MASS INDEX: 25.58 KG/M2 | WEIGHT: 163 LBS | HEIGHT: 67 IN

## 2025-04-21 DIAGNOSIS — R10.9 FLANK PAIN: ICD-10-CM

## 2025-04-21 DIAGNOSIS — J45.20 MILD INTERMITTENT ASTHMA WITHOUT COMPLICATION (HHS-HCC): ICD-10-CM

## 2025-04-21 DIAGNOSIS — R79.89 ELEVATED D-DIMER: ICD-10-CM

## 2025-04-21 DIAGNOSIS — R21 RASH: Primary | ICD-10-CM

## 2025-04-21 DIAGNOSIS — L50.9 URTICARIA: ICD-10-CM

## 2025-04-21 DIAGNOSIS — Z12.11 COLON CANCER SCREENING: ICD-10-CM

## 2025-04-21 DIAGNOSIS — E03.8 SUBCLINICAL HYPOTHYROIDISM: ICD-10-CM

## 2025-04-21 LAB
POC APPEARANCE, URINE: CLEAR
POC BILIRUBIN, URINE: NEGATIVE
POC BLOOD, URINE: NEGATIVE
POC COLOR, URINE: ABNORMAL
POC GLUCOSE, URINE: NEGATIVE MG/DL
POC KETONES, URINE: NEGATIVE MG/DL
POC LEUKOCYTES, URINE: NEGATIVE
POC NITRITE,URINE: NEGATIVE
POC PH, URINE: 6.5 PH
POC PROTEIN, URINE: NEGATIVE MG/DL
POC SPECIFIC GRAVITY, URINE: 1.01
POC UROBILINOGEN, URINE: 0.2 EU/DL

## 2025-04-21 PROCEDURE — 3008F BODY MASS INDEX DOCD: CPT | Performed by: NURSE PRACTITIONER

## 2025-04-21 PROCEDURE — 99214 OFFICE O/P EST MOD 30 MIN: CPT | Performed by: NURSE PRACTITIONER

## 2025-04-21 PROCEDURE — 1036F TOBACCO NON-USER: CPT | Performed by: NURSE PRACTITIONER

## 2025-04-21 PROCEDURE — 81002 URINALYSIS NONAUTO W/O SCOPE: CPT | Performed by: NURSE PRACTITIONER

## 2025-04-21 RX ORDER — DEXTROAMPHETAMINE SACCHARATE, AMPHETAMINE ASPARTATE MONOHYDRATE, DEXTROAMPHETAMINE SULFATE AND AMPHETAMINE SULFATE 6.25; 6.25; 6.25; 6.25 MG/1; MG/1; MG/1; MG/1
25 CAPSULE, EXTENDED RELEASE ORAL EVERY MORNING
COMMUNITY
Start: 2025-04-17

## 2025-04-21 RX ORDER — HYDROXYZINE PAMOATE 25 MG/1
25 CAPSULE ORAL 3 TIMES DAILY PRN
Qty: 21 CAPSULE | Refills: 0 | Status: SHIPPED | OUTPATIENT
Start: 2025-04-21

## 2025-04-21 RX ORDER — GABAPENTIN 300 MG/1
300 CAPSULE ORAL NIGHTLY
Qty: 90 CAPSULE | Refills: 1 | Status: SHIPPED | OUTPATIENT
Start: 2025-04-21 | End: 2025-10-18

## 2025-04-21 ASSESSMENT — ENCOUNTER SYMPTOMS
FREQUENCY: 0
HEMATURIA: 0
DIZZINESS: 0
SHORTNESS OF BREATH: 0
VOMITING: 0
DIARRHEA: 0
COUGH: 0
SLEEP DISTURBANCE: 1
PALPITATIONS: 0
WHEEZING: 0
FEVER: 0
HEADACHES: 0
DYSURIA: 0
WEAKNESS: 0
NAUSEA: 1
FATIGUE: 1
NERVOUS/ANXIOUS: 0
CHILLS: 0
ABDOMINAL PAIN: 1

## 2025-04-21 NOTE — PROGRESS NOTES
"Subjective   Patient ID: Solange Rowland is a 45 y.o. female who presents for ER Follow-up, Abdominal Pain, Nausea, Rash, and Flank Pain.    ER follow-up  Patient seen in [facility] emergency department on/10/25 and 4/13/2025.  Patient reports she was seen due to flank pain that radiates toward right upper quadrant and back.  Pain began the Sunday before/10/25.  Pain continued for days, constant and described as aching and burning with waves of stabbing pain.  Associated nausea, night sweats and constipation which have resolved.  CT of the abdomen, pelvis and lab work unremarkable.  Labs did show an elevated D-dimer at 1000 but unremarkable CTA of chest.  She reports her dad is an ear doctor and became concerned after evaluating her.  Urinalysis unremarkable.  Symptoms have improved significantly but patient now has a generalized rash to flank anterior and posterior.  Itching present.         Review of Systems   Constitutional:  Positive for fatigue. Negative for chills and fever.   Respiratory:  Negative for cough, shortness of breath and wheezing.    Cardiovascular:  Negative for chest pain and palpitations.   Gastrointestinal:  Positive for abdominal pain and nausea. Negative for diarrhea and vomiting.   Genitourinary:  Negative for dysuria, frequency, hematuria and vaginal pain.   Skin:  Positive for rash.   Neurological:  Negative for dizziness, weakness and headaches.   Psychiatric/Behavioral:  Positive for sleep disturbance. The patient is not nervous/anxious.        Objective   /76   Pulse 63   Temp 36.7 °C (98 °F) (Temporal)   Resp 18   Ht 1.702 m (5' 7\")   Wt 73.9 kg (163 lb)   SpO2 97%   BMI 25.53 kg/m²     Physical Exam  Vitals reviewed.   Constitutional:       Appearance: Normal appearance.   HENT:      Head: Normocephalic.   Cardiovascular:      Rate and Rhythm: Normal rate and regular rhythm.      Pulses: Normal pulses.      Heart sounds: Normal heart sounds.   Pulmonary:    "   Effort: Pulmonary effort is normal. No respiratory distress.      Breath sounds: Normal breath sounds. No wheezing.   Abdominal:      General: Bowel sounds are normal.   Skin:     Findings: Rash present. Rash is urticarial.          Neurological:      General: No focal deficit present.      Mental Status: She is alert and oriented to person, place, and time.   Psychiatric:         Mood and Affect: Mood normal.         Behavior: Behavior normal.         Assessment/Plan   Problem List Items Addressed This Visit           ICD-10-CM    Subclinical hypothyroidism E03.8      Lab Results   Component Value Date    TSH 3.71 04/17/2025              Uncomplicated asthma ([facility]) J45.909    Well-controlled.  Continue current plan of care         Elevated d-dimer R79.89    CTA chest negative  No swelling of the lower extremities, no associated shortness of breath         Relevant Orders    Fibrinogen    Protime-INR    Urticaria L50.9    Vistaril 25 mg as needed  Autoimmune markers ordered         Relevant Medications    hydrOXYzine pamoate (VistariL) 25 mg capsule    Rash - Primary R21    Vistaril 25 as needed for itching  Call for worsening         Relevant Orders    DARSHANA    Sedimentation Rate    Colon cancer screening Z12.11    Relevant Orders    Colonoscopy Screening; Average Risk Patient    Flank pain R10.9    UA unremarkable.  CT unremarkable.          Relevant Medications    gabapentin (Neurontin) 300 mg capsule    Other Relevant Orders    POCT UA (nonautomated) manually resulted (Completed)

## 2025-04-22 ENCOUNTER — TELEPHONE (OUTPATIENT)
Facility: CLINIC | Age: 45
End: 2025-04-22
Payer: COMMERCIAL

## 2025-04-23 LAB
ANA PAT SER IF-IMP: ABNORMAL
ANA SER QL IF: POSITIVE
ANA TITR SER IF: ABNORMAL TITER
ERYTHROCYTE [SEDIMENTATION RATE] IN BLOOD BY WESTERGREN METHOD: 11 MM/H

## 2025-04-24 ENCOUNTER — TELEPHONE (OUTPATIENT)
Dept: PRIMARY CARE | Facility: CLINIC | Age: 45
End: 2025-04-24
Payer: COMMERCIAL

## 2025-04-24 DIAGNOSIS — R76.8 POSITIVE ANA (ANTINUCLEAR ANTIBODY): Primary | ICD-10-CM

## 2025-04-24 NOTE — TELEPHONE ENCOUNTER
Spoke with patient via phone in regards to the positive DARSHANA and mitotic intercellular bridge pattern.  Spoke with patient about this could possibly be an autoimmune disorder but it is linked to some cancers.  At this time I do not see this being a cancer issue.  CBC is stable, no unexplained weight loss.  Does have some persistent pain but CT of the chest, abdomen and pelvis were negative however I did explain to patient that we would need a thorough workup with additional labs.  She would also need to see a specialist.  I explained to patient that I will order an ABIGAIL panel for reflex on the DARSHANA, check with lab about PT/INR and fibrinogen add on due to elevated D-dimer.  CT of the chest was unremarkable and ruled out PE during her ER visit.

## 2025-04-26 LAB
FIBRINOGEN PPP-MCNC: 356 MG/DL (ref 175–425)
INR PPP: 1
PROTHROMBIN TIME: 11 SEC (ref 9–11.5)

## 2025-04-28 ENCOUNTER — APPOINTMENT (OUTPATIENT)
Dept: PRIMARY CARE | Facility: CLINIC | Age: 45
End: 2025-04-28
Payer: COMMERCIAL

## 2025-04-28 VITALS
HEART RATE: 57 BPM | RESPIRATION RATE: 18 BRPM | DIASTOLIC BLOOD PRESSURE: 71 MMHG | OXYGEN SATURATION: 99 % | WEIGHT: 165.4 LBS | TEMPERATURE: 97.3 F | HEIGHT: 67 IN | BODY MASS INDEX: 25.96 KG/M2 | SYSTOLIC BLOOD PRESSURE: 103 MMHG

## 2025-04-28 DIAGNOSIS — R79.89 ELEVATED D-DIMER: ICD-10-CM

## 2025-04-28 DIAGNOSIS — R76.8 POSITIVE ANA (ANTINUCLEAR ANTIBODY): ICD-10-CM

## 2025-04-28 DIAGNOSIS — Z00.00 GENERAL MEDICAL EXAM: ICD-10-CM

## 2025-04-28 DIAGNOSIS — R10.9 FLANK PAIN: ICD-10-CM

## 2025-04-28 DIAGNOSIS — R21 RASH: Primary | ICD-10-CM

## 2025-04-28 LAB
CENTROMERE B AB SER-ACNC: ABNORMAL AI
DSDNA AB SER-ACNC: <1 IU/ML
ENA JO1 AB SER IA-ACNC: ABNORMAL AI
ENA RNP AB SER-ACNC: ABNORMAL AI
ENA SCL70 AB SER IA-ACNC: ABNORMAL AI
ENA SM AB SER IA-ACNC: ABNORMAL AI
ENA SM+RNP AB SER IA-ACNC: ABNORMAL AI
ENA SS-A AB SER IA-ACNC: ABNORMAL AI
ENA SS-B AB SER IA-ACNC: ABNORMAL AI
NUCLEOSOME AB SER IA-ACNC: ABNORMAL AI
RIBOSOMAL P AB SER-ACNC: ABNORMAL AI

## 2025-04-28 PROCEDURE — 3008F BODY MASS INDEX DOCD: CPT | Performed by: NURSE PRACTITIONER

## 2025-04-28 PROCEDURE — 99396 PREV VISIT EST AGE 40-64: CPT | Performed by: NURSE PRACTITIONER

## 2025-04-28 PROCEDURE — 99213 OFFICE O/P EST LOW 20 MIN: CPT | Performed by: NURSE PRACTITIONER

## 2025-04-28 PROCEDURE — 1036F TOBACCO NON-USER: CPT | Performed by: NURSE PRACTITIONER

## 2025-04-28 ASSESSMENT — ENCOUNTER SYMPTOMS
ACTIVITY CHANGE: 0
RESPIRATORY NEGATIVE: 1
DIARRHEA: 0
APNEA: 0
NERVOUS/ANXIOUS: 0
PALPITATIONS: 0
FATIGUE: 1
HEADACHES: 0
SHORTNESS OF BREATH: 0
WEAKNESS: 0
ABDOMINAL PAIN: 1
NAUSEA: 0
VOMITING: 0
COUGH: 0
CONFUSION: 0
FEVER: 0
DIZZINESS: 0
CHILLS: 0

## 2025-04-28 NOTE — PROGRESS NOTES
Subjective   Patient ID: Solange Rowland is a 45 y.o. female who presents for Flank Pain/ Back Pain , Rash, and Annual Exam.    Fatigue, Rash, Generalized Right Flank/ Back. Had a period of night sweats which resolved   Denies Fever, Myalgia     Annual Examination     -CBC and Auto Differential:   Collection Time: 04/17/25  8:31 AM       Result                      Value             Ref Range           WHITE BLOOD CELL COUNT      8.1               3.8 - 10.8 T*       RED BLOOD CELL COUNT        4.35              3.80 - 5.10 *       HEMOGLOBIN                  13.2              11.7 - 15.5 *       HEMATOCRIT                  40.7              35.0 - 45.0 %       MCV                         93.6              80.0 - 100.0*       MCH                         30.3              27.0 - 33.0 *       MCHC                        32.4              32.0 - 36.0 *       RDW                         13.3              11.0 - 15.0 %       PLATELET COUNT              273               140 - 400 Th*       MPV                         10.2              7.5 - 12.5 fL       ABSOLUTE NEUTROPHILS        3,677             1,500 - 7,80*       ABSOLUTE LYMPHOCYTES        3,524             850 - 3,900 *       ABSOLUTE MONOCYTES          543               200 - 950 ce*       ABSOLUTE EOSINOPHILS        284               15 - 500 galina*       ABSOLUTE BASOPHILS          73                0 - 200 cell*       NEUTROPHILS                 45.4              %                   LYMPHOCYTES                 43.5              %                   MONOCYTES                   6.7               %                   EOSINOPHILS                 3.5               %                   BASOPHILS                   0.9               %              -Comprehensive Metabolic Panel:   Collection Time: 04/17/25  8:31 AM       Result                      Value             Ref Range           GLUCOSE                     98                65 - 99 mg/dL       UREA NITROGEN  (BUN)         16                7 - 25 mg/dL        CREATININE                  0.68              0.50 - 0.99 *       EGFR                        109               > OR = 60 mL*       SODIUM                      142               135 - 146 mm*       POTASSIUM                   4.8               3.5 - 5.3 mm*       CHLORIDE                    108               98 - 110 mmo*       CARBON DIOXIDE              26                20 - 32 mmol*       ELECTROLYTE BALANCE         8                 7 - 17 mmol/*       CALCIUM                     9.5               8.6 - 10.2 m*       PROTEIN, TOTAL              6.8               6.1 - 8.1 g/*       ALBUMIN                     4.4               3.6 - 5.1 g/*       BILIRUBIN, TOTAL            0.2               0.2 - 1.2 mg*       ALKALINE PHOSPHATASE        49                31 - 125 U/L        AST                         18                10 - 35 U/L         ALT                         17                6 - 29 U/L     -Lipid Panel:   Collection Time: 04/17/25  8:31 AM       Result                      Value             Ref Range           CHOLESTEROL, TOTAL          167               <200 mg/dL          HDL CHOLESTEROL             67                > OR = 50 mg*       TRIGLYCERIDES               119               <150 mg/dL          LDL-CHOLESTEROL             78                mg/dL (calc)        CHOL/HDLC RATIO             2.5               <5.0 (calc)         NON HDL CHOLESTEROL         100               <130 mg/dL (*  -TSH with reflex to Free T4 if abnormal:   Collection Time: 04/17/25  8:31 AM       Result                      Value             Ref Range           TSH W/REFLEX TO FT4         3.71              mIU/L          -Vitamin D 25-Hydroxy,Total (for eval of Vitamin D levels):   Collection Time: 04/17/25  8:31 AM       Result                      Value             Ref Range           VITAMIN D,25-OH,TOTAL,*     29 (L)            30 - 100 ng/*  -POCT UA (nonautomated)  manually resulted:   Collection Time: 04/21/25  3:49 PM       Result                      Value             Ref Range           POC Color, Urine            Marie (A)         Straw, Yello*       POC Appearance, Urine       Clear             Clear               POC Glucose, Urine          NEGATIVE          NEGATIVE mg/*       POC Bilirubin, Urine        NEGATIVE          NEGATIVE            POC Ketones, Urine          NEGATIVE          NEGATIVE mg/*       POC Specific Gravity, *     1.015             1.005 - 1.035       POC Blood, Urine            NEGATIVE          NEGATIVE            POC PH, Urine               6.5               No Reference*       POC Protein, Urine          NEGATIVE          NEGATIVE mg/*       POC Urobilinogen, Urine     0.2               0.2, 1.0 EU/*       Poc Nitrite, Urine          NEGATIVE          NEGATIVE            POC Leukocytes, Urine       NEGATIVE          NEGATIVE       -DARSHANA:   Collection Time: 04/21/25  3:52 PM       Result                      Value             Ref Range           DARSHANA SCREEN, IFA             POSITIVE (A)      NEGATIVE            DARSHANA TITER                   1:40 (H)          titer               DARSHANA PATTERN                                                   Mitotic, Intercellular Bridge (A)  -Sedimentation Rate:   Collection Time: 04/21/25  3:52 PM       Result                      Value             Ref Range           SED RATE BY MODIFIED W*     11                < OR = 20 mm*  -Fibrinogen:   Collection Time: 04/25/25  9:01 AM       Result                      Value             Ref Range           FIBRINOGEN ACTIVITY, C*     356               175 - 425 mg*  -Protime-INR:   Collection Time: 04/25/25  9:01 AM       Result                      Value             Ref Range           INR                         1.0                                   PT                          11.0              9.0 - 11.5 s*          Review of Systems   Constitutional:  Positive for fatigue.  "Negative for activity change, chills and fever.   Respiratory: Negative.  Negative for apnea, cough and shortness of breath.    Cardiovascular:  Negative for chest pain and palpitations.   Gastrointestinal:  Positive for abdominal pain. Negative for diarrhea, nausea and vomiting.   Musculoskeletal:         Flank pain / right side abdomen pain   Neurological:  Negative for dizziness, weakness and headaches.   Psychiatric/Behavioral:  Negative for confusion. The patient is not nervous/anxious.        Objective   /71   Pulse 57   Temp 36.3 °C (97.3 °F) (Temporal)   Resp 18   Ht 1.702 m (5' 7\")   Wt 75 kg (165 lb 6.4 oz)   SpO2 99%   BMI 25.91 kg/m²     Physical Exam  Vitals reviewed.   Constitutional:       Appearance: Normal appearance.   HENT:      Head: Normocephalic.   Cardiovascular:      Rate and Rhythm: Normal rate and regular rhythm.      Pulses: Normal pulses.      Heart sounds: Normal heart sounds.   Pulmonary:      Effort: Pulmonary effort is normal. No respiratory distress.      Breath sounds: Normal breath sounds. No wheezing.   Abdominal:      General: Bowel sounds are normal.   Neurological:      General: No focal deficit present.      Mental Status: She is alert and oriented to person, place, and time.   Psychiatric:         Mood and Affect: Mood normal.         Behavior: Behavior normal.         Assessment/Plan   Problem List Items Addressed This Visit           ICD-10-CM    Elevated d-dimer R79.89    Negative CT of chest  Unremarkable PT/INR and fibrinogen level         Relevant Orders    Referral to Rheumatology    Rash - Primary R21    DARSHANA pattern / mitotic   Referral to rheumatology for eval   Can use otc hydrocortisone         Relevant Orders    Referral to Rheumatology    Flank pain R10.9    Urinalysis unremarkable           Relevant Orders    Referral to Rheumatology    Positive DARSHANA (antinuclear antibody) R76.8    Rheumatology consult d/t symptoms          Relevant Orders    " Referral to Rheumatology    General medical exam Z00.00    Well adult visi

## 2025-04-29 ENCOUNTER — APPOINTMENT (OUTPATIENT)
Dept: RADIOLOGY | Facility: CLINIC | Age: 45
End: 2025-04-29
Payer: COMMERCIAL

## 2025-04-29 VITALS — HEIGHT: 67 IN | BODY MASS INDEX: 25.9 KG/M2 | WEIGHT: 165 LBS

## 2025-04-29 DIAGNOSIS — Z12.31 SCREENING MAMMOGRAM FOR BREAST CANCER: ICD-10-CM

## 2025-04-29 PROCEDURE — 77067 SCR MAMMO BI INCL CAD: CPT

## 2025-04-29 PROCEDURE — 77063 BREAST TOMOSYNTHESIS BI: CPT | Performed by: RADIOLOGY

## 2025-04-29 PROCEDURE — 77067 SCR MAMMO BI INCL CAD: CPT | Performed by: RADIOLOGY

## 2025-04-30 ASSESSMENT — RHEUMATOLOGY NEW PATIENT QUESTIONNAIRE
BLACK STOOLS: N
COLOR CHANGES OF HANDS OR FEET IN THE COLD: Y
VOMITING OF BLOOD OR COFFEE GROUND CONSISTENCY MATERIAL: N
UNEXPLAINED WEIGHT CHANGE: Y
EYE REDNESS: N
HEADACHES: Y
SORES IN MOUTH OR NOSE: N
UNEXPLAINED HEARING LOSS: N
CHEST PAIN: N
MUSCLE WEAKNESS: N
SHORTNESS OF BREATH: N
NODULES/BUMPS: N
ABNORMAL URINE: N
BEHAVIORAL CHANGES: N
SWOLLEN LEGS OR FEET: Y
DRYNESS OF MOUTH: N
NAUSEA: Y
HEARTBURN OR REFLUX: Y
NUMBNESS OR TINGLING IN HANDS OR FEET: Y
EYE DRYNESS: Y
MEMORY LOSS: N
DOUBLE OR BLURRED VISION: N
RASH: Y
LOSS OF VISION: N
DEPRESSION: N
SEIZURES: N
DIFFICULTY BREATHING LYING DOWN: N
SKIN TIGHTNESS: N
JAUNDICE: N
FEVER: N
PAIN OR BURNING ON URINATION: N
COUGH: N
EXCESSIVE HAIR LOSS (MORE THAN YOUR NORM): Y
EASY BRUISING: Y
VAGINAL DRYNESS: N
MORNING STIFFNESS: Y
UNUSUAL BLEEDING: N
STOMACH PAIN: N
ANEMIA: N
AGITATION: N
JOINT PAIN: Y
LOSS OF CONSCIOUSNESS: N
MORNING STIFFNESS IN LOWER BACK: Y
SWOLLEN OR TENDER GLANDS: N
UNUSUAL FATIGUE: Y
FAINTING: N
EASILY LOSING TEMPER: N
DIFFICULTY SWALLOWING: N
SUN SENSITIVE (SUN ALLERGY): N
RASH OR ULCERS: N
JOINT SWELLING: N
SKIN REDNESS: Y
HOW WOULD YOU DESCRIBE YOUR STIFFNESS ON AVERAGE: MILD
BLOOD IN STOOLS: N
DIFFICULTY STAYING ASLEEP: N
PERSISTENT DIARRHEA: N
NIGHT SWEATS: Y
EYE PAIN: Y
ANXIETY: N
HOARSE VOICE: N
INCREASED SUSCEPTIBILITY TO INFECTION: N
DIFFICULTY FALLING ASLEEP: Y
UNUSUALLY RAPID OR SLOWED HEART RATE: Y

## 2025-05-05 ENCOUNTER — APPOINTMENT (OUTPATIENT)
Dept: PRIMARY CARE | Facility: CLINIC | Age: 45
End: 2025-05-05
Payer: COMMERCIAL

## 2025-05-06 ENCOUNTER — APPOINTMENT (OUTPATIENT)
Dept: RHEUMATOLOGY | Facility: CLINIC | Age: 45
End: 2025-05-06
Payer: COMMERCIAL

## 2025-05-06 VITALS — WEIGHT: 164 LBS | BODY MASS INDEX: 25.74 KG/M2 | HEIGHT: 67 IN

## 2025-05-06 DIAGNOSIS — M25.50 ARTHRALGIA, UNSPECIFIED JOINT: ICD-10-CM

## 2025-05-06 DIAGNOSIS — R10.9 FLANK PAIN: ICD-10-CM

## 2025-05-06 DIAGNOSIS — R76.8 POSITIVE ANA (ANTINUCLEAR ANTIBODY): ICD-10-CM

## 2025-05-06 DIAGNOSIS — I73.00 RAYNAUD'S DISEASE WITHOUT GANGRENE: Primary | ICD-10-CM

## 2025-05-06 DIAGNOSIS — M35.00 SICCA SYNDROME (MULTI): ICD-10-CM

## 2025-05-06 DIAGNOSIS — R21 RASH: ICD-10-CM

## 2025-05-06 DIAGNOSIS — R79.89 ELEVATED D-DIMER: ICD-10-CM

## 2025-05-06 PROCEDURE — 1036F TOBACCO NON-USER: CPT | Performed by: INTERNAL MEDICINE

## 2025-05-06 PROCEDURE — 3008F BODY MASS INDEX DOCD: CPT | Performed by: INTERNAL MEDICINE

## 2025-05-06 PROCEDURE — 99204 OFFICE O/P NEW MOD 45 MIN: CPT | Performed by: INTERNAL MEDICINE

## 2025-05-06 ASSESSMENT — PAIN SCALES - GENERAL: PAINLEVEL_OUTOF10: 1

## 2025-05-06 NOTE — PROGRESS NOTES
Subjective   Patient ID: Solange Rowland is a 45 y.o. female who presents for New Patient Visit (New patient visit. Lower back pain.).    HPI  46 yo female  She reports fatigue  She is having intermittent hand joint pain and shoulder pain and back pain  She had severe muscle, back pain a few weeks ago  Her AM stiffness lasts around 30 min, reports mild stiffness  She has h/o rosacea  She had skin rash in her body lasted a couple days after easter  She is having dry eyes and using eye drops, mild dry mouth  Denies any mouth ulcers  She has Raynaud started many years ago, got worse recently  No h/o thyroid diseases  Denies smoking, occasional alcohol  Family h/o grandmother had RA, sister Dmyositis, aunt had RA  No h/o thrombosis  She had one miscarriage      ROS  Joint pain in hands: negative   Joint swelling: negative  Morning stiffness and duration:   strength: normal  Oral ulcer: negative  Genital ulcer: negative  Raynaud phenomenon: negative  Chest pain/dyspnea: negative  Low back pain: negative  Visual problem: negative  Dry eyes/dry mouth: negative  Skin rash/scaling/psoriasis: negative       Objective     PEXAM  VS reviewed, WNL  General: Alert, no distress   HEENT: Normocephalic/atraumatic, No alopecia. PERRLA. Sclera white, conjunctiva pink, no malar rash. no oral or nasal ulcer. Oral cavity pink and moist, no erythema or exudate, dentition good.   Neck: supple  Respiratory: CTA B, no adventitious breath sounds  Cardiac: RRR, no murmurs, carotid, or bruits  Abdominal: symmetrical, soft, non-tender, non-distended, normoactive BSx4 quadrants, no CVA tenderness or suprapubic tenderness  MSK: Joints of upper and lower extremities were assessed for synovitis and ROM.    Today she has no evidence of synovitis in the joints of her hands or wrists, tender joint count 0, swollen joint count 0   Extremities: no clubbing, no cyanosis, no edema  Skin: Skin warm and moist.   Neuro: non-focal, Strength 5/5  throughout. Normal gait. No cerebellar pathologic exam     Assessment/Plan     46 yo female with pos DARSHANA, RNP  She had rash, fatigue, flank pain in 04/25. Work-up showed pos DARSHANA  She reports long h/o Raynaud and dry eyes, using eye drops  Also, she has h/o rosacea  No other CTD findings  Family h/o showed Dmyositis and RA  Pexam did not reveal any rashes or synovitis  In this time, no SLE or other CTDs  Her DARSHANA, RNP are very weak pos, DARSHANA 1/40  -will call us if she has any new symptoms  -will see her in winter time for Raynaud

## 2025-11-11 ENCOUNTER — APPOINTMENT (OUTPATIENT)
Dept: RHEUMATOLOGY | Facility: CLINIC | Age: 45
End: 2025-11-11
Payer: COMMERCIAL

## 2026-01-16 ENCOUNTER — APPOINTMENT (OUTPATIENT)
Dept: OPHTHALMOLOGY | Age: 46
End: 2026-01-16
Payer: COMMERCIAL

## 2026-04-28 ENCOUNTER — APPOINTMENT (OUTPATIENT)
Dept: PRIMARY CARE | Facility: CLINIC | Age: 46
End: 2026-04-28
Payer: COMMERCIAL